# Patient Record
Sex: FEMALE | Employment: OTHER
[De-identification: names, ages, dates, MRNs, and addresses within clinical notes are randomized per-mention and may not be internally consistent; named-entity substitution may affect disease eponyms.]

---

## 2017-07-21 ENCOUNTER — FOLLOW UP (OUTPATIENT)
Age: 72
End: 2017-07-21

## 2017-07-21 NOTE — PATIENT DISCUSSION
I have recommended vitrectomy surgery to remove the bothersome floaters from the left eye. I have discussed all risk and benefits of surgery with pt. and all questions have been answered.

## 2017-07-24 ASSESSMENT — TONOMETRY
OD_IOP_MMHG: 13
OS_IOP_MMHG: 15

## 2017-07-24 ASSESSMENT — VISUAL ACUITY
OS_SC: 20/25+1
OD_SC: 20/20-2

## 2018-03-16 ENCOUNTER — FOLLOW UP (OUTPATIENT)
Age: 73
End: 2018-03-16

## 2018-03-16 NOTE — PATIENT DISCUSSION
I have recommended vitrectomy surgery to remove the bothersome floaters from the left eye. I have discussed all risk and benefits of surgery with the patient and all questions have been answered. Mrs Lyons Notice understands and desires to schedule PPV OS.

## 2018-03-19 ASSESSMENT — TONOMETRY
OD_IOP_MMHG: 18
OS_IOP_MMHG: 18

## 2018-03-19 ASSESSMENT — VISUAL ACUITY
OS_SC: 20/25
OD_SC: 20/20